# Patient Record
Sex: FEMALE | Race: WHITE | NOT HISPANIC OR LATINO | URBAN - METROPOLITAN AREA
[De-identification: names, ages, dates, MRNs, and addresses within clinical notes are randomized per-mention and may not be internally consistent; named-entity substitution may affect disease eponyms.]

---

## 2021-07-05 ENCOUNTER — EMERGENCY (EMERGENCY)
Facility: HOSPITAL | Age: 26
LOS: 1 days | Discharge: HOME | End: 2021-07-05
Attending: EMERGENCY MEDICINE | Admitting: EMERGENCY MEDICINE
Payer: COMMERCIAL

## 2021-07-05 VITALS
OXYGEN SATURATION: 98 % | RESPIRATION RATE: 18 BRPM | HEART RATE: 86 BPM | DIASTOLIC BLOOD PRESSURE: 78 MMHG | TEMPERATURE: 98 F | HEIGHT: 64 IN | SYSTOLIC BLOOD PRESSURE: 118 MMHG | WEIGHT: 128.09 LBS

## 2021-07-05 DIAGNOSIS — R05 COUGH: ICD-10-CM

## 2021-07-05 PROCEDURE — 99283 EMERGENCY DEPT VISIT LOW MDM: CPT

## 2021-07-05 RX ORDER — DEXAMETHASONE 0.5 MG/5ML
4 ELIXIR ORAL ONCE
Refills: 0 | Status: COMPLETED | OUTPATIENT
Start: 2021-07-05 | End: 2021-07-05

## 2021-07-05 RX ORDER — ALBUTEROL 90 UG/1
2 AEROSOL, METERED ORAL ONCE
Refills: 0 | Status: COMPLETED | OUTPATIENT
Start: 2021-07-05 | End: 2021-07-05

## 2021-07-05 RX ADMIN — ALBUTEROL 2 PUFF(S): 90 AEROSOL, METERED ORAL at 20:12

## 2021-07-05 RX ADMIN — Medication 4 MILLIGRAM(S): at 20:12

## 2021-07-05 NOTE — ED PROVIDER NOTE - OBJECTIVE STATEMENT
27 yo female pt, no hx of med problems, nkda. Presents w cough for 3 days, dry, no production of phlegm. Then developed a hoarse voice. Pt is fully vaccinated for covid. some pleuritic pain w coughing. no sob, no abd pain, no fever, no chills, no n/v/d.

## 2021-07-05 NOTE — ED ADULT NURSE NOTE - OBJECTIVE STATEMENT
Pt presents c/o persistent dry cough x1 week.  Pt denies fever/chills or SOB.  No wheezing auscultated.  Pt endorses receiving COVID vax greater than 2 weeks PTA.  Pt throat w/o signs of infection.  Pt pending eval by [facility].

## 2021-07-05 NOTE — ED ADULT NURSE NOTE - CHIEF COMPLAINT QUOTE
Encounter Date: 3/9/2017       History     Chief Complaint   Patient presents with    Rash     sm bumps arms, hand, back, breast x 4days     Review of patient's allergies indicates:   Allergen Reactions    Chocolate flavor     Latex, natural rubber      HPI Comments: Lili James is a 26 y.o. Female presenting for evaluation of multiple, red bumps on her arms, back, chest for the last 4 days.  She doesn't remember coming into contact with any thing in particular that could cause the rash.  No new soaps, lotions or ointments.  She doesn't remember being bitten by any insects.  No fever, no chills.  She has taken Benadryl with little relief.  She does have a history of sensitive skin, and states that she gets hives frequently.  She is noticed no drainage or bleeding from the area.  No facial swelling, difficulty breathing or shortness of breath.    The history is provided by the patient.     Past Medical History:   Diagnosis Date    Allergy     Breast disorder      Past Surgical History:   Procedure Laterality Date    BREAST SURGERY       Family History   Problem Relation Age of Onset    Cancer Maternal Grandmother      Social History   Substance Use Topics    Smoking status: Never Smoker    Smokeless tobacco: None    Alcohol use No     Review of Systems   Constitutional: Negative for chills and fever.   HENT: Negative for facial swelling.    Respiratory: Negative for cough, chest tightness, shortness of breath and wheezing.    Cardiovascular: Negative for chest pain and palpitations.   Gastrointestinal: Negative for abdominal pain, diarrhea, nausea and vomiting.   Musculoskeletal: Negative for arthralgias, back pain, joint swelling, myalgias, neck pain and neck stiffness.   Skin: Positive for rash. Negative for color change, pallor and wound.   Neurological: Negative for dizziness, syncope, weakness, light-headedness, numbness and headaches.   Hematological: Does not bruise/bleed easily.       Physical  Exam   Initial Vitals   BP Pulse Resp Temp SpO2   03/09/17 2203 03/09/17 2203 03/09/17 2203 03/09/17 2203 03/09/17 2203   122/77 83 16 98 °F (36.7 °C) 100 %     Physical Exam    Nursing note and vitals reviewed.  Constitutional: She appears well-developed and well-nourished. She is not diaphoretic. No distress.   HENT:   Head: Normocephalic and atraumatic.   Right Ear: External ear normal.   Left Ear: External ear normal.   Nose: Nose normal.   Mouth/Throat: Oropharynx is clear and moist.   Eyes: Conjunctivae are normal.   Neck: Normal range of motion. Neck supple.   Cardiovascular: Normal rate, regular rhythm, normal heart sounds and intact distal pulses.   Pulmonary/Chest: Breath sounds normal. No respiratory distress. She has no wheezes. She has no rhonchi. She has no rales.   Musculoskeletal: Normal range of motion. She exhibits tenderness. She exhibits no edema.   Neurological: She is alert and oriented to person, place, and time. She has normal strength. No sensory deficit.   Skin: Skin is warm and dry. Rash noted. No abscess noted. No erythema.   Multiple areas of erythematous, round lesions to bilateral upper extremities, chest and back.  No induration.  No active bleeding or discharge.  No yellow crusting.         ED Course   Procedures  Labs Reviewed - No data to display                APC / Resident Notes:   Her symptoms are most consistent with a localized ALLERGIC reaction, possibly to insect bites.  There is a low clinical suspicion for acute anaphylaxis or angioedema and we do not feel any further imaging or testing is necessary at this time.  She is given a dose of Decadron here in the emergency department.  She is discharged home to continue taking Benadryl as needed for the itching.  She will apply topical hydrocortisone cream.  She is encouraged to return here if symptoms persist or worsen despite treatment.  She voices understanding and is agreeable to the plan.  She is given specific return  precautions.              ED Course     Clinical Impression:   The primary encounter diagnosis was Allergic reaction, initial encounter. A diagnosis of Rash was also pertinent to this visit.          Loyda Childers PA-C  03/10/17 0151     c/o non-productive dry cough x 3 days with soreness to chest. denies h/o asthma. +vaccinated. +speaking in full sentences

## 2021-07-05 NOTE — ED PROVIDER NOTE - PATIENT PORTAL LINK FT
You can access the FollowMyHealth Patient Portal offered by [facility] by registering at the following website: [email or web address]. By joining [facility]’s FollowMyHealth portal, you will also be able to view your health information using other applications (apps) compatible with our system.

## 2021-07-05 NOTE — ED ADULT TRIAGE NOTE - CHIEF COMPLAINT QUOTE
c/o non-productive dry cough x 3 days with soreness to chest. denies h/o asthma. +vaccinated. +speaking in full sentences

## 2021-07-05 NOTE — ED PROVIDER NOTE - CLINICAL SUMMARY MEDICAL DECISION MAKING FREE TEXT BOX
Pt w likely viral URI, will treat symptomatically w bronchodilator inhaler, decadron dose and will prescribe tessalon perles. no clinical signs to suggest a bacterial infection at this time. Pt is well appearing and understands this is likely a viral URI. Encouraged to RTER if any worsening.

## 2024-02-20 NOTE — ED PROVIDER NOTE - IV ALTEPASE ADMIN HIDDEN
[FreeTextEntry1] : 46-year-old woman with a long-standing history of obesity presents for a weight loss medication follow-up. 4 lb weight gain since last visit 4 months ago;. Pt is interested in restarting weight loss medications. Nutrition and exercise guidelines reviewed with the patient.    Continue 3 protein-focused meals/day (aim for 60 g - 70 g protein/day) Encourage zero calorie fluid intake (64 oz/day) Exercise with cardio (aim for 8k-10k steps/day) and strength training 2-3x/week Use step counter Weigh yourself 1x-2x/week Try the Calm nemesio or Soothing Pod nemesio for stress management Follow-up with nutritionist (keep a food log) Check blood work Will send for Zepbound and work on prior authorization. If not approved will try wegovy. Follow-up 3 weeks after starting Zepbound 2.5 mg/week; call the office right away with any side effects.   Discussed with the pt the importance of avoiding pregnancy while on Zepbound. All questions answered   Call with any questions or concerns   Time before and after visit spent reviewing chart  show

## 2025-04-03 NOTE — ED ADULT NURSE NOTE - NS ED NOTE ABUSE SUSPICION NEGLECT YN
[facility] Pharmacy Prior Authorization Team   Phone: [phone]    PA Initiation    Medication: WEGOVY 0.25 MG/0.5ML SC SOAJ  Insurance Company: [employer] - Phone [phone] Fax [phone]  Pharmacy Filling the Rx: [place], [place] - [address]  Filling Pharmacy Phone: [phone]  Filling Pharmacy Fax:    Start Date: 4/3/2025     No